# Patient Record
Sex: FEMALE | URBAN - METROPOLITAN AREA
[De-identification: names, ages, dates, MRNs, and addresses within clinical notes are randomized per-mention and may not be internally consistent; named-entity substitution may affect disease eponyms.]

---

## 2021-07-14 ENCOUNTER — APPOINTMENT (RX ONLY)
Dept: URBAN - METROPOLITAN AREA CLINIC 23 | Facility: CLINIC | Age: 56
Setting detail: DERMATOLOGY
End: 2021-07-14

## 2021-07-14 DIAGNOSIS — Z41.9 ENCOUNTER FOR PROCEDURE FOR PURPOSES OTHER THAN REMEDYING HEALTH STATE, UNSPECIFIED: ICD-10-CM

## 2021-07-14 PROCEDURE — ? DYSPORT

## 2021-07-14 PROCEDURE — ? FILLERS

## 2021-07-14 NOTE — PROCEDURE: DYSPORT
Lot #: [id number]
Forehead Units: 0
Show Additional Area 1: Yes
Additional Area 5 Location: glabella
Show Lcl Units: No
Additional Area 3 Location: high forehead 3 cm above brows
Additional Area 1 Location: lat brows
Dilution (U/ 0.1cc): 1.5
Glabellar Complex Units: [address]
Consent: Written consent obtained. Risks include but not limited to lid/brow ptosis, bruising, swelling, diplopia, temporary effect, incomplete chemical denervation.
Detail Level: Simple
Additional Area 1 Units: 10
Expiration Date (Month Year): 2022-03
Additional Area 4 Location: 2cm high forehead
Additional Area 2 Location: crows feet
Price (Use Numbers Only, No Special Characters Or $): 0.00

## 2021-07-14 NOTE — PROCEDURE: FILLERS
Marionette Lines Filler  Volume In Cc: 0
Additional Area 5 Location: Cheeks, vermillion lips, marionette fine lines, glabellar fine lines, lateral mouth
Map Statment: See [address] for Complete Details
Include Cannula Information In Note?: No
Additional Area 4 Location: Perioral
Lot #: [id number]
Anesthesia Type: 1% lidocaine without epinephrine
Additional Area 2 Location: Nasalabial, Glabellar fine lines- Complimentary
Expiration Date (Month Year): 2023-12
Lot #: [id number]
Expiration Date (Month Year): 09/2021
Additional Anesthesia Volume In Cc: 6
Include Cannula Size?: 25G
Include Cannula Brand?: DermaSculpt
Additional Area 1 Location: cheeks, lateral mouth, and lip line
Include Cannula Length?: 1.5 inch
Additional Area 1 Volume In Cc: 1
Additional Area 2 Location: cheeks, jawline, oral commissure
Lot #: [id number]
Consent: Written consent obtained. Risks include but not limited to bruising, beading, irregular texture, ulceration, infection, allergic reaction, scar formation, incomplete augmentation, temporary nature, procedural pain.
Additional Area 1 Location: lateral mouth, perioral fine lines, vermillion lips, marionette lines
Detail Level: Zone
Post-Care Instructions: Patient instructed to apply ice to reduce swelling.
Filler: Britany Crews
Expiration Date (Month Year): 08/31/2019
Additional Area 2 Location: Lips, oral commissure, glabellar fine fines
Additional Area 1 Location: lateral mouth, fine lines perioral, marionette lines, lateral cheeks, vermillion lips
Price (Use Numbers Only, No Special Characters Or $): 0.00
Additional Area 4 Location: lateral jawline, lateral mouth, glabella lines,
Additional Area 3 Location: Glabbellar fine lines, Nasalabial folds, Marionette lines, vermillion lip

## 2023-04-18 ENCOUNTER — APPOINTMENT (RX ONLY)
Dept: URBAN - METROPOLITAN AREA CLINIC 23 | Facility: CLINIC | Age: 58
Setting detail: DERMATOLOGY
End: 2023-04-18

## 2023-04-18 DIAGNOSIS — Z41.9 ENCOUNTER FOR PROCEDURE FOR PURPOSES OTHER THAN REMEDYING HEALTH STATE, UNSPECIFIED: ICD-10-CM

## 2023-04-18 PROCEDURE — ? DYSPORT

## 2023-04-18 PROCEDURE — ? FILLERS

## 2023-04-18 NOTE — PROCEDURE: DYSPORT
Additional Area 6 Units: 0
Show Additional Area 6: Yes
Additional Area 4 Location: 2cm high forehead
Price (Use Numbers Only, No Special Characters Or $): 0.00
Show Right And Left Brow Units: No
Additional Area 3 Location: glabella
Show Inventory Tab: Show
Expiration Date (Month Year): 2022-08
Additional Area 2 Location: crows feet
Detail Level: Simple
Glabellar Complex Units: [address]
Dilution (U/ 0.1cc): 1.5
Additional Area 1 Location: neck bands
Lot #: [id number]
Consent: Written consent obtained. Risks include but not limited to lid/brow ptosis, bruising, swelling, diplopia, temporary effect, incomplete chemical denervation.

## 2023-04-18 NOTE — PROCEDURE: FILLERS
Brows Filler  Volume In Cc: 0
Use Map Statement For Sites (Optional): No
Number Of Syringes (Required For Inventory): 1
Map Statment: See [address] for Complete Details
Additional Area 1 Location: lateral mouth, perioral fine lines, marionette lines.
Consent: Written consent obtained. Risks include but not limited to bruising, beading, irregular texture, ulceration, infection, allergic reaction, scar formation, incomplete augmentation, temporary nature, procedural pain.
Post-Care Instructions: Patient instructed to apply ice to reduce swelling.
Anesthesia Type: 1% lidocaine with epinephrine
Inventory Information: This plan will send filler information to inventory based on the fillers you select. Multiple fillers can be sent but you must ensure you select the appropriate fillers in the inventory tab.
Anesthesia Volume In Cc: 0.5
Additional Anesthesia Volume In Cc: 6
Additional Area 1 Location: right lat cheek and oral commesure
Detail Level: Detailed
Show Inventory Tab: Show
Filler: Restylane Contour